# Patient Record
Sex: MALE | Race: OTHER | URBAN - METROPOLITAN AREA
[De-identification: names, ages, dates, MRNs, and addresses within clinical notes are randomized per-mention and may not be internally consistent; named-entity substitution may affect disease eponyms.]

---

## 2023-09-29 ENCOUNTER — TELEMEDICINE (OUTPATIENT)
Dept: INTERNAL MEDICINE CLINIC | Facility: CLINIC | Age: 43
End: 2023-09-29

## 2023-09-29 ENCOUNTER — PATIENT MESSAGE (OUTPATIENT)
Dept: INTERNAL MEDICINE CLINIC | Facility: CLINIC | Age: 43
End: 2023-09-29

## 2023-09-29 DIAGNOSIS — J06.9 VIRAL UPPER RESPIRATORY TRACT INFECTION: Primary | ICD-10-CM

## 2023-09-29 DIAGNOSIS — J06.9 VIRAL UPPER RESPIRATORY TRACT INFECTION: ICD-10-CM

## 2023-09-29 DIAGNOSIS — J30.2 SEASONAL ALLERGIES: ICD-10-CM

## 2023-09-29 PROCEDURE — 99203 OFFICE O/P NEW LOW 30 MIN: CPT | Performed by: FAMILY MEDICINE

## 2023-09-29 RX ORDER — PROMETHAZINE HYDROCHLORIDE AND CODEINE PHOSPHATE 6.25; 1 MG/5ML; MG/5ML
2.5 SYRUP ORAL EVERY 4 HOURS PRN
Qty: 118 ML | Refills: 0 | Status: SHIPPED | OUTPATIENT
Start: 2023-09-29 | End: 2023-09-29

## 2023-09-29 RX ORDER — PROMETHAZINE HYDROCHLORIDE AND CODEINE PHOSPHATE 6.25; 1 MG/5ML; MG/5ML
2.5 SYRUP ORAL EVERY 4 HOURS PRN
Qty: 118 ML | Refills: 0 | Status: SHIPPED | OUTPATIENT
Start: 2023-09-29

## 2023-09-29 NOTE — ASSESSMENT & PLAN NOTE
With an upper respiratory infection for the past 6 days. Suspect viral related. There could be a component of mild viral sinusitis, however he notes that his sinuses are not in significant pain at this time  If lingering longer, should be assessed in person to consider bacterial sinusitis. Difficult to fully assess however as this is a video visit and I have never met this patient in person before. ER precautions discussed  He is requesting promethazine-codeine-provided. Symptomatic treatment also discussed with patient. Stay hydrated. Tylenol and Advil as needed. Lozenges as needed. Follow-up as needed.

## 2023-09-29 NOTE — PROGRESS NOTES
Telehealth outside of [address] Verbal Consent   I conducted a telehealth visit with Wade Gray today, 09/29/23, which was completed using two-way, real-time interactive audio and video communication. This has been done in good ra to provide continuity of care in the best interest of the provider-patient relationship, due to the COVID -19 public health crisis/national emergency where restrictions of face-to-face office visits are ongoing. Every conscious effort was taken to allow for sufficient and adequate time to complete the visit. The patient was made aware of the limitations of the telehealth visit, including treatment limitations as no physical exam could be performed. The patient was advised to call 911 or to go to the ER in case there was an emergency. The patient was also advised of the potential privacy & security concerns related to the telehealth platform. The patient was made aware of where to find [facility] notice of privacy practices, telehealth consent form and other related consent forms and documents. which are located on the [facility] website. The patient verbally agreed to telehealth consent form, related consents and the risks discussed. Lastly, the patient confirmed that they were in [place]. Included in this visit, time may have been spent reviewing labs, medications, radiology tests and decision making. Appropriate medical decision-making and tests are ordered as detailed in the plan of care above. Coding/billing information is submitted for this visit based on complexity of care and/or time spent for the visit.     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  -using cari pot      #Seasonal allergies  -flonase  -claritin      ROS  GENERAL: No fever/chills, no recent weight loss  HEENT: No visual changes, no changes in hearing, no sore throats  NECK: No pain, no swelling  RESP: +cough, no SOB  CV: No chest pain, no palpitations  GI: No abd pain, no N/V/D  MSK: No edema  SKIN: No new rashes  NEURO: No numbness, no tingling, no headaches    HEALTH MAINTENANCE  Health Maintenance Topics with due status: Overdue       Topic Date Due    Annual Physical Never done    COVID-19 Vaccine Never done    DTaP,Tdap,and Td Vaccines Never done    Annual Depression Screening Never done       ALLERGIES    Guaifenesin             SHORTNESS OF BREATH    MEDICATIONS  Current Outpatient Medications   Medication Sig Dispense Refill    promethazine-codeine 6.25-10 MG/5ML Oral Syrup Take 2.5 mL by mouth every 4 (four) hours as needed for cough.  118 mL 0       ACTIVE PROBLEMS  Patient Active Problem List:     Seasonal allergies     Viral upper respiratory tract infection      PAST MEDICAL HISTORY  Past Medical History:   Diagnosis Date    Seasonal allergies 09/29/2023       PAST SOCIAL HISTORY  Social History    Socioeconomic History      Marital status: Not on file      Spouse name: Not on file      Number of children: Not on file      Years of education: Not on file      Highest education level: Not on file    Occupational History      Not on file    Tobacco Use      Smoking status: Never      Smokeless tobacco: Never    Vaping Use      Vaping Use: Never used    Substance and Sexual Activity      Alcohol use: Yes        Comment: occasional - 1-2 drinks a week      Drug use: Never      Sexual activity: Yes        Partners: Female    Other Topics      Concerns:        Not on file    Social History Narrative      Not on file    Social Determinants of Health  Financial Resource Strain: Not on file  Food Insecurity: Not on file  Transportation Needs: Not on file  Physical Activity: Not on file  Stress: Not on file  Social Connections: Not on file  Housing Stability: Not on file    PAST SURGICAL HISTORY  No past surgical history on file. PAST FAMILY HISTORY  Family History   Problem Relation Age of Onset    No Known Problems Mother     No Known Problems Father     No Known Problems Sister     No Known Problems Maternal Grandmother     No Known Problems Maternal Grandfather     No Known Problems Paternal Grandmother     No Known Problems Paternal Grandfather          PHYSICAL EXAM  There were no vitals filed for this visit. There is no height or weight on file to calculate BMI. GENERAL: NAD, walking and pacing throughout the room  RESP: Non-labored respirations. Occasional wet sounding cough seen on video  NEURO: Answering questions appropriately    LABS  No results found for: \"WBC\", \"HGB\", \"HCT\", \"PLT\", \"NEPERCENT\", \"LYPERCENT\", \"MOPERCENT\", \"EOPERCENT\", \"BAPERCENT\", \"NE\", \"LYMABS\", \"MOABSO\", \"EOABSO\", \"BAABSO\", \"REITCPERCENT\"    No results found for: \"NA\", \"K\", \"CL\", \"CO2\", \"ANIONGAP\", \"BUN\", \"CREATSERUM\", \"BUNCREA\", \"GLU\", \"CA\", \"OSMOCALC\", \"GFRNAA\", \"GFRAA\", \"ALT\", \"AST\", \"ALKPHO\", \"BILT\", \"TP\", \"ALB\", \"GLOBULIN\", \"ELECTAG\", \"FASTING\"      No results found for: \"CHOLEST\", \"TRIG\", \"HDL\", \"LDL\", \"VLDL\", \"TCHDLRATIO\", \"NONHDLC\", \"CHOLHDLRATIO\", \"CALCNONHDL\"     DIAGNOSTICS      ASSESSMENT/PLAN    All assessment and plan limited by the fact that the visit is a video visit. Problem List Items Addressed This Visit          EarNoseThroat    Seasonal allergies     History of seasonal allergies  Continue Flonase and Claritin. Relevant Medications    promethazine-codeine 6.25-10 MG/5ML Oral Syrup    Viral upper respiratory tract infection - Primary     With an upper respiratory infection for the past 6 days. Suspect viral related.   There could be a component of mild viral sinusitis, however he notes that his sinuses are not in significant pain at this time  If lingering longer, should be assessed in person to consider bacterial sinusitis. Difficult to fully assess however as this is a video visit and I have never met this patient in person before. ER precautions discussed  He is requesting promethazine-codeine-provided. Symptomatic treatment also discussed with patient. Stay hydrated. Tylenol and Advil as needed. Lozenges as needed. Follow-up as needed. Relevant Medications    promethazine-codeine 6.25-10 MG/5ML Oral Syrup       Return if symptoms worsen or fail to improve.     Annual Physical Never done     Donna Celeste MD  Family Medicine           Pre-chartin minutes  Reviewing/obtaining: 10 minutes  Medical Exam:1 minutes  Counseling/education: 10 minutes  Notes: 5 minutes  Referring/communicatin minutes  Care coordination: 0 minutes    My total time spent caring for the patient on the day of the encounter: 28 minutes

## 2023-09-29 NOTE — TELEPHONE ENCOUNTER
From: Sabrina Magana  To: Polly Klinefelter  Sent: 9/29/2023 11:00 AM CDT  Subject: Pharmacy     Sorry for inconvenience it was wrong pharmacy. And also the cough pill for during day that we were talking about ! Thanks      Could send it to  [facility]  [id number] [address]

## 2023-10-06 ENCOUNTER — TELEPHONE (OUTPATIENT)
Dept: INTERNAL MEDICINE CLINIC | Facility: CLINIC | Age: 43
End: 2023-10-06

## 2023-10-06 NOTE — TELEPHONE ENCOUNTER
Bernadine, can you follow up with the pharmacy to see what is going on for this?  Thank you, Jona Dakins

## 2023-10-06 NOTE — TELEPHONE ENCOUNTER
Patient is calling in stating Drs office needs to call pharmacy to verify prescription. Patient states he has been waiting for medication for 3 days.     [id number] - [place] - [address] [facility], [phone]   [address] [place] [address] [place] [address]   Phone: [phone] Fax: [phone]   Hours: Not open 24 hours

## 2023-10-11 DIAGNOSIS — J06.9 VIRAL UPPER RESPIRATORY TRACT INFECTION: ICD-10-CM

## 2023-10-11 RX ORDER — PROMETHAZINE HYDROCHLORIDE AND CODEINE PHOSPHATE 6.25; 1 MG/5ML; MG/5ML
2.5 SYRUP ORAL EVERY 4 HOURS PRN
Qty: 118 ML | Refills: 0 | OUTPATIENT
Start: 2023-10-11

## 2023-10-12 ENCOUNTER — TELEPHONE (OUTPATIENT)
Facility: CLINIC | Age: 43
End: 2023-10-12

## 2023-10-12 ENCOUNTER — TELEMEDICINE (OUTPATIENT)
Facility: CLINIC | Age: 43
End: 2023-10-12

## 2023-10-12 DIAGNOSIS — J06.9 VIRAL UPPER RESPIRATORY TRACT INFECTION: ICD-10-CM

## 2023-10-12 DIAGNOSIS — U07.1 COVID-19 VIRUS INFECTION: Primary | ICD-10-CM

## 2023-10-12 PROCEDURE — 99213 OFFICE O/P EST LOW 20 MIN: CPT | Performed by: FAMILY MEDICINE

## 2023-10-12 RX ORDER — PROMETHAZINE HYDROCHLORIDE AND CODEINE PHOSPHATE 6.25; 1 MG/5ML; MG/5ML
2.5 SYRUP ORAL EVERY 4 HOURS PRN
Qty: 75 ML | Refills: 0 | Status: SHIPPED | OUTPATIENT
Start: 2023-10-12 | End: 2023-10-12

## 2023-10-12 NOTE — TELEPHONE ENCOUNTER
PharmRICK Buckner from [facility] calling. Confirmed patient's name and . She states that a script for Promethazine with Codeine was received on  from Dr. Colette Saldana (see telemedicine visit) from [facility]. The script was picked-up at their [facility] location on 10/10 by someone with an ID that had a photo that matched the person's face but had a different name on it. She notes that the patient's address is [facility] and he is using a [address] [place] [address] Road which is 50 miles away. She has received the Promethizine with Codeine script from Dr. Mechelle Ghosh but due to the above circumstances she is cancelling it. Called patient's [facility] where the  script was originally sent, confirmed patient's name and . They state that the patient did not receive the medication from their location and they have cancelled the script.

## 2023-10-12 NOTE — PROGRESS NOTES
HPI:    Patient ID: Juno Macedo is a 43year old male who presents for cough and congestion. HPI  Sx started this past Sunday or Monday. Sx include cough, congestion, right maxillary sinus pain. Tested positive for covid this Monday. No fevers or chills. No SOB or wheezing. No chest pain or tightness. Using netipot and OTC meds. Using flonase and claritin daily for AR. No other PMH. No prior covid infection. Hard for him to swallow benzonatate. Allergic to robitussin. Requesting refill of cough syrup as it works well. Past Medical History:   Diagnosis Date    Seasonal allergies 09/29/2023      No past surgical history on file. Current Outpatient Medications   Medication Sig Dispense Refill    promethazine-codeine 6.25-10 MG/5ML Oral Syrup Take 2.5 mL by mouth every 4 (four) hours as needed for cough.  118 mL 0          Guaifenesin             SHORTNESS OF BREATH    Family History   Problem Relation Age of Onset    No Known Problems Mother     No Known Problems Father     No Known Problems Sister     No Known Problems Maternal Grandmother     No Known Problems Maternal Grandfather     No Known Problems Paternal Grandmother     No Known Problems Paternal Grandfather      Social History    Socioeconomic History      Marital status: Unknown      Spouse name: Not on file      Number of children: Not on file      Years of education: Not on file      Highest education level: Not on file    Occupational History      Not on file    Tobacco Use      Smoking status: Never      Smokeless tobacco: Never    Vaping Use      Vaping Use: Never used    Substance and Sexual Activity      Alcohol use: Yes        Comment: occasional - 1-2 drinks a week      Drug use: Never      Sexual activity: Yes        Partners: Female    Other Topics      Concerns:        Not on file    Social History Narrative      Not on file    Social Determinants of Health  Financial Resource Strain: Not on file  Food Insecurity: Not on file  Transportation Needs: Not on file  Physical Activity: Not on file  Stress: Not on file  Social Connections: Not on file  Housing Stability: Not on file    Review of Systems   See HPI. Otherwise negative. There were no vitals taken for this visit. PHYSICAL EXAM:   Physical Exam  Constitutional:       General: He is not in acute distress. Appearance: Normal appearance. He is not ill-appearing, toxic-appearing or diaphoretic. HENT:      Head: Normocephalic and atraumatic. Pulmonary:      Effort: Pulmonary effort is normal.   Neurological:      General: No focal deficit present. Mental Status: He is alert. Psychiatric:         Mood and Affect: Mood normal.         Behavior: Behavior normal.         Thought Content: Thought content normal.         Judgment: Judgment normal.             ASSESSMENT/PLAN:   Covid-19 virus infection  (primary encounter diagnosis)    1. COVID-19 virus infection  -Reviewed typical management.   -Overall stable. -Rx sent for cough syrup given allergy to robitussin and cannot tolerate benzonatate.   -Otherwise supportive care. -Reviewed warning signs & return precautions.   -Encouraged to establish with PCP for annual visit, and may schedule with us. Meds This Visit:  Requested Prescriptions      No prescriptions requested or ordered in this encounter       Imaging & Referrals:  None    Please note that the following visit was completed using two-way, real-time interactive audio and video communication. This has been done in good ra to provide continuity of care in the best interest of the provider-patient relationship, due to the ongoing public health crisis/national emergency and because of restrictions of visitation. There are limitations of this visit as no physical exam could be performed. Every conscious effort was taken to allow for sufficient and adequate time.   This billing was spent on reviewing labs, medications, radiology tests and decision making. Appropriate medical decision-making and tests are ordered as detailed in the plan of care above.        Esther Corley DO  [facility]#[id number]